# Patient Record
Sex: FEMALE | URBAN - METROPOLITAN AREA
[De-identification: names, ages, dates, MRNs, and addresses within clinical notes are randomized per-mention and may not be internally consistent; named-entity substitution may affect disease eponyms.]

---

## 2019-06-28 ENCOUNTER — APPOINTMENT (RX ONLY)
Dept: URBAN - METROPOLITAN AREA CLINIC 26 | Facility: CLINIC | Age: 29
Setting detail: DERMATOLOGY
End: 2019-06-28

## 2019-06-28 DIAGNOSIS — L81.1 CHLOASMA: ICD-10-CM

## 2019-06-28 DIAGNOSIS — L57.8 OTHER SKIN CHANGES DUE TO CHRONIC EXPOSURE TO NONIONIZING RADIATION: ICD-10-CM

## 2019-06-28 DIAGNOSIS — Z87.2 PERSONAL HISTORY OF DISEASES OF THE SKIN AND SUBCUTANEOUS TISSUE: ICD-10-CM

## 2019-06-28 DIAGNOSIS — D22 MELANOCYTIC NEVI: ICD-10-CM

## 2019-06-28 PROBLEM — D22.9 MELANOCYTIC NEVI, UNSPECIFIED: Status: ACTIVE | Noted: 2019-06-28

## 2019-06-28 PROCEDURE — ? INVENTORY

## 2019-06-28 PROCEDURE — ? SKIN MEDICINALS

## 2019-06-28 PROCEDURE — ? TREATMENT REGIMEN

## 2019-06-28 PROCEDURE — 99214 OFFICE O/P EST MOD 30 MIN: CPT

## 2019-06-28 PROCEDURE — ? PATIENT SPECIFIC COUNSELING

## 2019-06-28 PROCEDURE — ? COUNSELING

## 2019-06-28 ASSESSMENT — LOCATION ZONE DERM
LOCATION ZONE: TRUNK
LOCATION ZONE: FACE

## 2019-06-28 ASSESSMENT — LOCATION SIMPLE DESCRIPTION DERM
LOCATION SIMPLE: RIGHT CHEEK
LOCATION SIMPLE: LEFT UPPER BACK

## 2019-06-28 ASSESSMENT — LOCATION DETAILED DESCRIPTION DERM
LOCATION DETAILED: LEFT INFERIOR MEDIAL UPPER BACK
LOCATION DETAILED: RIGHT INFERIOR CENTRAL MALAR CHEEK

## 2019-06-28 NOTE — PROCEDURE: TREATMENT REGIMEN
Initiate Treatment: [facility] lightening cream (Hydroquinone 8%, Tretinoin 0.1%, Kojic Acid 1%, Niacinamide 4%, Fluocinolone 0.025% Cream) qhs to face for melasma x 3 months max
Detail Level: Zone
Plan: Prescription sent via [facility] website. Instructions provided to patient. All questions answered. Patient states comprehension and in agreement.
Otc Regimen: continue with strict photoprotection

## 2019-06-28 NOTE — PROCEDURE: [facility]
Sig: Apply pea sized amount per area at night
Lightening Cream: Hydroquinone 8%, Tretinoin 0.1%, Kojic Acid 1%, Niacinamide 4%, Fluocinonide 0.025% Cream
Sig: Apply twice daily for 5 days
Sig: Apply nightly to warts nightly under occlusion
Intro Statement: I recommended the following products:
Sig: Apply to affected areas twice daily
Sig: Apply twice daily for 4-7 days to face, hands and arms
Sig: Apply pea sized amount to face at night as directed
Detail Level: Zone
Sig: Apply a thin layer to affected areas twice daily for 6 weeks
Product Type (1): Lightening Cream
Sig: Apply to affected areas on face twice daily

## 2019-06-28 NOTE — HPI: EVALUATION OF SKIN LESION(S)
Hpi Title: Evaluation of Skin Lesions
Year Removed: 1900
Additional History: Patient later requests to discuss her melasma. She states that it has been present for years, since she switched OCPs. She notes that she wears sunscreen with spf 30, 50, sometimes 70 daily, but the melasma is unchanged. She would like to discuss treatment options, as she states that the discoloration has been making her feel uncomfortable going outdoor, it has been making her self-conscious. She states she is currently on OCPs, wears sunscreen daily, and is not pregnant, not planning pregnancy.

## 2022-02-23 ENCOUNTER — OFFICE VISIT (OUTPATIENT)
Dept: FAMILY MEDICINE | Facility: CLINIC | Age: 32
End: 2022-02-23
Payer: COMMERCIAL

## 2022-02-23 VITALS
WEIGHT: 150.8 LBS | SYSTOLIC BLOOD PRESSURE: 110 MMHG | HEART RATE: 87 BPM | RESPIRATION RATE: 16 BRPM | OXYGEN SATURATION: 98 % | TEMPERATURE: 98.1 F | BODY MASS INDEX: 25.12 KG/M2 | HEIGHT: 65 IN | DIASTOLIC BLOOD PRESSURE: 62 MMHG

## 2022-02-23 DIAGNOSIS — M25.59 PAIN IN OTHER JOINT: Primary | ICD-10-CM

## 2022-02-23 PROCEDURE — 99203 OFFICE O/P NEW LOW 30 MIN: CPT | Performed by: FAMILY MEDICINE

## 2022-02-23 PROCEDURE — 3008F BODY MASS INDEX DOCD: CPT | Performed by: FAMILY MEDICINE

## 2022-02-23 RX ORDER — METHYLPREDNISOLONE 4 MG/1
4 TABLET ORAL DAILY
COMMUNITY
End: 2022-02-23 | Stop reason: DRUGHIGH

## 2022-02-23 RX ORDER — MELOXICAM 15 MG/1
15 TABLET ORAL DAILY
Qty: 30 TABLET | Refills: 0 | Status: SHIPPED | OUTPATIENT
Start: 2022-02-23 | End: 2022-08-22

## 2022-02-23 RX ORDER — PREDNISONE 50 MG/1
50 TABLET ORAL DAILY
Qty: 5 TABLET | Refills: 0 | Status: SHIPPED | OUTPATIENT
Start: 2022-02-23 | End: 2022-02-28

## 2022-02-23 NOTE — PATIENT INSTRUCTIONS
Have labs done at your earliest convenience, I will call with results    Follow up with rheumatology as planned    Follow up with me based on labs results

## 2022-02-23 NOTE — PROGRESS NOTES
"Subjective      Patient ID: Mary Sanon is a 31 y.o. female is here for the following:    Joint pain  -Diffuse joint pain in patient's feet, hands, shoulders  -Pain in feet has been chronic and is being worked on by a podiatrist.  MRI showed arthritis, plantar fasciitis, Achilles tendinopathy bilaterally.  Pain in hands and shoulders has been more recent over the past week or so  -No history of autoimmune disease  -Lyme test was negative through urgent care recently  -No rashes  -Currently on prednisone with mild improvement in symptoms      The following have been reviewed and updated as appropriate in this visit:   Tobacco  Allergies  Meds  Problems  Med Hx  Surg Hx  Fam Hx         There is no problem list on file for this patient.      Social History     Tobacco Use   • Smoking status: Never Smoker   • Smokeless tobacco: Never Used   Substance Use Topics   • Alcohol use: Yes     Comment: socially   • Drug use: Never       Allergies as of 02/23/2022   • (No Known Allergies)         Current Outpatient Medications:   •  methylPREDNISolone (MEDROL) 4 mg tablet, Take 4 mg by mouth daily., Disp: , Rfl:     Review of systems  Per HPI.    Objective   Vitals:    02/23/22 0856   BP: 110/62   BP Location: Left upper arm   Patient Position: Sitting   Pulse: 87   Resp: 16   Temp: 36.7 °C (98.1 °F)   TempSrc: Temporal   SpO2: 98%   Weight: 68.4 kg (150 lb 12.8 oz)   Height: 1.65 m (5' 4.96\")     Body mass index is 25.12 kg/m².    Physical Exam  Vitals reviewed.   Constitutional:       General: She is not in acute distress.     Appearance: She is well-developed. She is not toxic-appearing.   Cardiovascular:      Rate and Rhythm: Normal rate and regular rhythm.      Heart sounds: Normal heart sounds.   Pulmonary:      Effort: Pulmonary effort is normal. No respiratory distress.      Breath sounds: Normal breath sounds.   Musculoskeletal:      Comments: No warmth, erythema, nodules over joints in her hands   Skin:     " General: Skin is warm and dry.      Capillary Refill: Capillary refill takes less than 2 seconds.      Findings: No rash.   Neurological:      Mental Status: She is alert.      Motor: No weakness.   Psychiatric:         Mood and Affect: Mood normal.         Behavior: Behavior normal.         Thought Content: Thought content normal.         Judgment: Judgment normal.       No images are attached to the encounter.     Assessment/Plan   Problem List Items Addressed This Visit     None      Visit Diagnoses     Pain in other joint    -  Primary  Present in feet for several months, more recently worsening in her hands and shoulders  No history of autoimmune disease  Lyme negative recently    -Check labs as below  -Increase prednisone to 50 mg daily x5 days, if symptoms come back after she stops the prednisone she will start meloxicam 15 mg daily  -Follow-up with rheumatology as planned next week  -Follow-up with me as needed based on results of labs    Relevant Orders    TSH w reflex FT4    DMITRI Screen (Automated)    C-reactive protein    Sedimentation rate, automated    Rheumatoid factor    CBC and differential    Basic metabolic panel          I spent 30 minutes on this date of service performing the following activities: obtaining history, performing examination, entering orders, documenting and providing counseling and education.      Emery Dye MD  2/23/2022

## 2022-02-24 LAB
BASOPHILS # BLD AUTO: 0 X10E3/UL (ref 0–0.2)
BASOPHILS NFR BLD AUTO: 0 %
EOSINOPHIL # BLD AUTO: 0 X10E3/UL (ref 0–0.4)
EOSINOPHIL NFR BLD AUTO: 0 %
ERYTHROCYTE [DISTWIDTH] IN BLOOD BY AUTOMATED COUNT: 12.4 % (ref 11.7–15.4)
FERRITIN SERPL-MCNC: 53 NG/ML (ref 15–150)
FOLATE SERPL-MCNC: 6.1 NG/ML
HCT VFR BLD AUTO: 36.5 % (ref 34–46.6)
HGB BLD-MCNC: 12.2 G/DL (ref 11.1–15.9)
IMM GRANULOCYTES # BLD AUTO: 0 X10E3/UL (ref 0–0.1)
IMM GRANULOCYTES NFR BLD AUTO: 0 %
IRON SATN MFR SERPL: 30 % (ref 15–55)
IRON SERPL-MCNC: 84 UG/DL (ref 27–159)
LYMPHOCYTES # BLD AUTO: 2.1 X10E3/UL (ref 0.7–3.1)
LYMPHOCYTES NFR BLD AUTO: 25 %
MCH RBC QN AUTO: 29.3 PG (ref 26.6–33)
MCHC RBC AUTO-ENTMCNC: 33.4 G/DL (ref 31.5–35.7)
MCV RBC AUTO: 88 FL (ref 79–97)
MONOCYTES # BLD AUTO: 0.2 X10E3/UL (ref 0.1–0.9)
MONOCYTES NFR BLD AUTO: 3 %
NEUTROPHILS # BLD AUTO: 6 X10E3/UL (ref 1.4–7)
NEUTROPHILS NFR BLD AUTO: 72 %
PLATELET # BLD AUTO: 376 X10E3/UL (ref 150–450)
RBC # BLD AUTO: 4.16 X10E6/UL (ref 3.77–5.28)
TIBC SERPL-MCNC: 282 UG/DL (ref 250–450)
UIBC SERPL-MCNC: 198 UG/DL (ref 131–425)
VIT B12 SERPL-MCNC: 478 PG/ML (ref 232–1245)
WBC # BLD AUTO: 8.4 X10E3/UL (ref 3.4–10.8)

## 2022-02-25 ENCOUNTER — TELEPHONE (OUTPATIENT)
Dept: FAMILY MEDICINE | Facility: CLINIC | Age: 32
End: 2022-02-25
Payer: COMMERCIAL

## 2022-02-25 NOTE — TELEPHONE ENCOUNTER
All of her labs are normal thus far, though they are not the labs that I initially ordered please call to let patient know that we are unsure of why they did not draw the labs that I ordered.  We will try to add the labs onto the test that they did draw.

## 2022-03-01 ENCOUNTER — TELEPHONE (OUTPATIENT)
Dept: FAMILY MEDICINE | Facility: CLINIC | Age: 32
End: 2022-03-01
Payer: COMMERCIAL

## 2022-03-01 LAB
ANA SER QL: NEGATIVE
BUN SERPL-MCNC: 18 MG/DL (ref 6–20)
BUN/CREAT SERPL: 19 (ref 9–23)
CALCIUM SERPL-MCNC: 9.7 MG/DL (ref 8.7–10.2)
CHLORIDE SERPL-SCNC: 105 MMOL/L (ref 96–106)
CO2 SERPL-SCNC: 19 MMOL/L (ref 20–29)
CREAT SERPL-MCNC: 0.96 MG/DL (ref 0.57–1)
CRP SERPL-MCNC: 2 MG/L (ref 0–10)
EGFRCR SERPLBLD CKD-EPI 2021: 81 ML/MIN/1.73
GLUCOSE SERPL-MCNC: 100 MG/DL (ref 65–99)
POTASSIUM SERPL-SCNC: 4.4 MMOL/L (ref 3.5–5.2)
RHEUMATOID FACT SERPL-ACNC: 242.4 IU/ML
SODIUM SERPL-SCNC: 142 MMOL/L (ref 134–144)
TSH SERPL DL<=0.005 MIU/L-ACNC: 0.86 UIU/ML (ref 0.45–4.5)

## 2022-03-01 NOTE — TELEPHONE ENCOUNTER
Pt would like to know what ALL of her lab results are as she is going to a Rheumatologist today and would like to have the lab results sent to that doctor. She did not leave the name or number of that Rheumatologist

## 2022-03-14 LAB — SPECIMEN STATUS: NORMAL
